# Patient Record
Sex: MALE
[De-identification: names, ages, dates, MRNs, and addresses within clinical notes are randomized per-mention and may not be internally consistent; named-entity substitution may affect disease eponyms.]

---

## 2019-04-12 ENCOUNTER — HOSPITAL ENCOUNTER (EMERGENCY)
Dept: HOSPITAL 36 - ER | Age: 69
Discharge: HOME | End: 2019-04-12
Payer: MEDICARE
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
## 2019-04-12 DIAGNOSIS — S39.012A: Primary | ICD-10-CM
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
## 2019-04-12 PROCEDURE — Z7502: HCPCS
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
## 2019-04-13 NOTE — DIAGNOSTIC IMAGING REPORT
Exam: Lumbosacral spine.



HISTORY: Pain



Prior exam: None



Findings:



Multiple views of lumbar sacral spine reviewed.  The study demonstrates

no evidence of fracture dislocation.  There is no evidence of

spondylolysis or spondylolisthesis mild narrowing of the L5-S1

intervertebral disc spaces noted.  No prevertebral soft tissue swelling

is noted.  The pedicles are intact.



IMPRESSION:



Mild degenerative narrowing of the L5-S1 intervertebral disc space.